# Patient Record
(demographics unavailable — no encounter records)

---

## 2025-05-28 NOTE — PHYSICAL EXAM
[de-identified] : Right Ankle: Tenderness in the mid Achilles. Pain with ankle dorsiflexion and extension.  Left Ankle:  Range of Motion in Degrees: 	                                                 Claimant:	       Normal:	 Dorsiflexion (Active)	              40-degrees	   40-degrees	 Dorsiflexion (Passive)	              40-degrees	   40-degrees	 Plantar (Active)	                      40-degrees	   40-degrees	 Plantar (Passive)	                      40-degrees	   40-degrees	 Inversion (Active)	                      30-degrees	   30-degrees	 Inversion (Passive)	                      30-degrees	   30-degrees	 Eversion  (Active)	                      20-degrees	   20-degrees	 Eversion (Passive) 	                      20-degrees	   20-degrees	  No weakness in dorsiflexion, plantar flexion, inversion or eversion.  Normal sensation.  No tenderness over the medial or lateral ligaments.  No tenderness over the DLES.  No evidence of instability.  Negative anterior drawer sign.  No medial or lateral bony tenderness.  No proximal fibular tenderness.  No anterior, posterior, medial or lateral tendon tenderness.  No intra-articular swelling.  No extra-articular swelling, edema or tenderness.  No tenderness over the plantar aspect of the os calcis.  2+ DP and PT pulses. Skin is intact.  No rashes, scars or lesions.     [de-identified] : Ambulating with a slightly antalgic to antalgic gait.  Station:  Normal.  [de-identified] : Appearance:  Well-developed, well-nourished male in no acute distress.   [de-identified] : Radiographs, which were taken in the office today, two views of the right ankle, show no obvious osseous abnormalities.

## 2025-05-28 NOTE — HISTORY OF PRESENT ILLNESS
[de-identified] : The patient comes in today with complaints of pain to his right ankle. He notes tenderness in his Achilles when he goes up and down the ladder and he has pain in the mid aspect.

## 2025-05-28 NOTE — DISCUSSION/SUMMARY
[de-identified] : At this time, due to Achilles tendinitis of the right ankle, I recommend a heel lift, ice, topical anti-inflammatory and physical therapy. He will be reassessed in four to six weeks.  none

## 2025-05-28 NOTE — REASON FOR VISIT
Caller: DELL     Best call back number: [phone]    
[FreeTextEntry2] : a new concern to the right ankle

## 2025-06-07 NOTE — ADDENDUM
[FreeTextEntry1] : I, Umesh Tellez, documented this note as a scribe on behalf of Dr. Mohan Anand on 06/05/2025.

## 2025-06-07 NOTE — DISCUSSION/SUMMARY
[de-identified] : Patient with a benign-appearing incidentally found lesion in the left proximal femur, without change seen since last visit. He is cleared for regular activity. Follow up in one year or as needed for radiographic and clinical surveillance, sooner if any increase in pain.  If imaging or pathology/biopsy was ordered, the patient was told to make an appointment to review findings right after all imaging is completed.   We discussed risks, benefits and alternatives. Rationale of care was reviewed and all questions were answered.

## 2025-06-07 NOTE — END OF VISIT
[FreeTextEntry3] : All medical record entries made by the Scribe were at my, Dr. Mohan Anand, direction and personally dictated by me on 06/05/2025. I have reviewed the chart and agree that the record accurately reflects my personal performance of the history, physical exam, assessment and plan. I have also personally directed, reviewed, and agreed with the chart.

## 2025-06-07 NOTE — HISTORY OF PRESENT ILLNESS
[FreeTextEntry1] : Patient is doing well overall, here for routine surveillance. He has been having some Achilles tendinitis on the right side, but is not having any problems on the left, and is able to do all his regular activities.

## 2025-06-07 NOTE — DISCUSSION/SUMMARY
[de-identified] : Patient with a benign-appearing incidentally found lesion in the left proximal femur, without change seen since last visit. He is cleared for regular activity. Follow up in one year or as needed for radiographic and clinical surveillance, sooner if any increase in pain.  If imaging or pathology/biopsy was ordered, the patient was told to make an appointment to review findings right after all imaging is completed.   We discussed risks, benefits and alternatives. Rationale of care was reviewed and all questions were answered.

## 2025-06-07 NOTE — DATA REVIEWED
[de-identified] : X-rays today (06/05/2025), AP pelvis and views of the left hip, show the same lesion as seen before in the superior left femoral neck. Since our oldest X-rays in January 2023, there is no change. It is sclerotic, geographic, with no signs of any fracture.

## 2025-06-07 NOTE — DATA REVIEWED
[de-identified] : X-rays today (06/05/2025), AP pelvis and views of the left hip, show the same lesion as seen before in the superior left femoral neck. Since our oldest X-rays in January 2023, there is no change. It is sclerotic, geographic, with no signs of any fracture.

## 2025-06-07 NOTE — PHYSICAL EXAM
[FreeTextEntry1] : On exam the patient stands in good balance.  He is able to walk around appropriately with obvious varus knees.  He has no pain in the hip and has good flexion to 110 degrees as well as good rotation without any pain.  He is neurovascular intact distally.  He has no inguinal lymphadenopathy. [Tenderness] : no tenderness [Swelling] : no swelling [Skin Changes - Describe changes:] : No skin changes noted

## 2025-07-23 NOTE — HISTORY OF PRESENT ILLNESS
[de-identified] : The patient comes in today still having problems with his right Achilles and he also rolled his left ankle.

## 2025-07-23 NOTE — DISCUSSION/SUMMARY
[de-identified] : At this time, due to lateral ankle sprain of the left ankle and Achilles tendinitis of the right ankle, I recommended a low-profile brace for the left ankle and for the right, he will continue his current home therapeutic modalities. He will be reassessed in three to four weeks.

## 2025-07-23 NOTE — PHYSICAL EXAM
[de-identified] : Right Ankle:  Range of Motion in Degrees:                                            	               Claimant:           	Normal:	 Dorsiflexion (Active)	                     40-degrees	           40-degrees	 Dorsiflexion (Passive)	                     40-degrees	           40-degrees	 Plantar (Active)              	                     40-degrees	           40-degrees	 Plantar (Passive)                	             40-degrees	           40-degrees	 Inversion (Active)                	             30-degrees	           30-degrees	 Inversion (Passive)                   	     30-degrees	           30-degrees	 Eversion  (Active)                        	     20-degrees	           20-degrees	 Eversion (Passive)  	                     20-degrees 	   20-degrees	  No weakness in dorsiflexion, plantar flexion, inversion or eversion.  Normal sensation.  Diffuse swelling.  No tenderness over the medial or lateral ligaments.  No tenderness over the DLES.  No evidence of instability. Negative anterior drawer sign.  No medial or lateral bony tenderness.  No proximal fibular tenderness.  No anterior, posterior, medial or lateral tendon tenderness.  No intra-articular swelling.  No extra-articular swelling, edema or tenderness.  No tenderness over the plantar aspect of the os calcis.  2+ DP and PT pulses. Skin is intact.  No rashes, scars or lesions.  Tenderness along the Achilles tendon.  No palpable defects of the Achilles.    Left Ankle:  Range of Motion in Degrees: 	                                Claimant:	Normal:	 Dorsiflexion (Active)	40-degrees	40-degrees	 Dorsiflexion (Passive)	40-degrees	40-degrees	 Plantar (Active)       	40-degrees	40-degrees	 Plantar (Passive)     	40-degrees	40-degrees	 Inversion (Active)    	30-degrees	30-degrees	 Inversion (Passive)  	30-degrees	30-degrees	 Eversion  (Active)   	        20-degrees	20-degrees	 Eversion (Passive)   	20-degrees	20-degrees	  No weakness in dorsiflexion, plantar flexion, inversion or eversion.  Normal sensation.  Positive diffuse swelling laterally.  Tender over the lateral ligaments.  No tenderness over the medial ligaments.  No tenderness over the DLES.  No evidence of instability.  Negative anterior drawer sign.  No medial bony tenderness.  No proximal fibular tenderness.  No anterior, posterior, medial or lateral tendon tenderness.  No intra-articular swelling.  No extra-articular swelling, edema or tenderness.  No tenderness over the plantar aspect of the os calcis.  2+ DP and PT pulses. Skin is intact.  No rashes, scars or lesions.     [de-identified] : Ambulating with a slightly antalgic to antalgic gait.  Station:  Normal.  [de-identified] : Appearance:  Well-developed, well-nourished male in no acute distress.    [de-identified] : Radiographs, which were taken in the office today, two views of the left ankle, show acute osseous abnormalities, although on the plantar aspect, there appears to be calcification or an avulsion off his plantar spur.

## 2025-07-23 NOTE — ADDENDUM
[FreeTextEntry1] : This note was written by Kandy Ha on 07/23/2025 acting as a scribe for KISHORE ROQUE III, MD